# Patient Record
Sex: FEMALE | Race: WHITE | NOT HISPANIC OR LATINO | Employment: OTHER | URBAN - METROPOLITAN AREA
[De-identification: names, ages, dates, MRNs, and addresses within clinical notes are randomized per-mention and may not be internally consistent; named-entity substitution may affect disease eponyms.]

---

## 2019-05-17 PROBLEM — K59.09 OTHER CONSTIPATION: Status: ACTIVE | Noted: 2019-05-17

## 2019-05-17 PROBLEM — R39.81 FUNCTIONAL URINARY INCONTINENCE: Status: ACTIVE | Noted: 2019-05-17

## 2019-05-17 PROBLEM — R06.83 SNORING: Status: ACTIVE | Noted: 2019-05-17

## 2019-05-17 PROBLEM — E66.01 MORBID OBESITY WITH BMI OF 40.0-44.9, ADULT (HCC): Status: ACTIVE | Noted: 2019-05-17

## 2019-05-17 PROBLEM — I10 ESSENTIAL HYPERTENSION: Status: ACTIVE | Noted: 2019-05-17

## 2019-05-17 PROBLEM — R01.1 HEART MURMUR: Status: ACTIVE | Noted: 2019-05-17

## 2019-05-17 PROBLEM — G89.29 CHRONIC MIDLINE LOW BACK PAIN: Status: ACTIVE | Noted: 2019-05-17

## 2019-05-17 PROBLEM — K64.8 OTHER HEMORRHOIDS: Status: ACTIVE | Noted: 2019-05-17

## 2019-05-17 PROBLEM — H90.6 MIXED CONDUCTIVE AND SENSORINEURAL HEARING LOSS OF BOTH EARS: Status: ACTIVE | Noted: 2019-05-17

## 2019-05-17 PROBLEM — R09.81 SINUS CONGESTION: Status: ACTIVE | Noted: 2019-05-17

## 2019-05-17 PROBLEM — R60.1 GENERALIZED EDEMA: Status: ACTIVE | Noted: 2019-05-17

## 2019-05-17 PROBLEM — M54.50 CHRONIC MIDLINE LOW BACK PAIN: Status: ACTIVE | Noted: 2019-05-17

## 2019-07-01 PROBLEM — Z13.6 ENCOUNTER FOR LIPID SCREENING FOR CARDIOVASCULAR DISEASE: Status: ACTIVE | Noted: 2019-07-01

## 2019-07-01 PROBLEM — D50.9 IRON DEFICIENCY ANEMIA: Status: ACTIVE | Noted: 2019-07-01

## 2019-07-01 PROBLEM — Z13.220 ENCOUNTER FOR LIPID SCREENING FOR CARDIOVASCULAR DISEASE: Status: ACTIVE | Noted: 2019-07-01

## 2019-10-17 PROBLEM — M79.605 PAIN IN LEFT LEG: Status: ACTIVE | Noted: 2019-10-17

## 2021-03-03 PROBLEM — R60.1 GENERALIZED EDEMA: Status: RESOLVED | Noted: 2019-05-17 | Resolved: 2021-03-03

## 2021-03-03 PROBLEM — G89.29 CHRONIC MIDLINE LOW BACK PAIN: Status: RESOLVED | Noted: 2019-05-17 | Resolved: 2021-03-03

## 2021-03-03 PROBLEM — Z13.220 ENCOUNTER FOR LIPID SCREENING FOR CARDIOVASCULAR DISEASE: Status: RESOLVED | Noted: 2019-07-01 | Resolved: 2021-03-03

## 2021-03-03 PROBLEM — R09.81 SINUS CONGESTION: Status: RESOLVED | Noted: 2019-05-17 | Resolved: 2021-03-03

## 2021-03-03 PROBLEM — M79.605 PAIN IN LEFT LEG: Status: RESOLVED | Noted: 2019-10-17 | Resolved: 2021-03-03

## 2021-03-03 PROBLEM — Z13.6 ENCOUNTER FOR LIPID SCREENING FOR CARDIOVASCULAR DISEASE: Status: RESOLVED | Noted: 2019-07-01 | Resolved: 2021-03-03

## 2021-03-03 PROBLEM — M54.50 CHRONIC MIDLINE LOW BACK PAIN: Status: RESOLVED | Noted: 2019-05-17 | Resolved: 2021-03-03

## 2021-10-18 ENCOUNTER — TELEPHONE (OUTPATIENT)
Dept: HEALTH INFORMATION MANAGEMENT | Facility: OTHER | Age: 86
End: 2021-10-18

## 2021-10-18 NOTE — TELEPHONE ENCOUNTER
Outcome: granddaughter declined annual with pcp   Please transfer to Patient Outreach Team at [phone] when patient returns call.        Attempt #1

## 2022-02-08 PROBLEM — K59.09 OTHER CONSTIPATION: Status: RESOLVED | Noted: 2019-05-17 | Resolved: 2022-02-08

## 2022-02-08 PROBLEM — K64.8 OTHER HEMORRHOIDS: Status: RESOLVED | Noted: 2019-05-17 | Resolved: 2022-02-08

## 2022-08-29 PROBLEM — C34.92 ADENOCARCINOMA OF LUNG, LEFT (HCC): Status: ACTIVE | Noted: 2022-08-29

## 2022-08-29 PROBLEM — J90 RECURRENT PLEURAL EFFUSION ON LEFT: Status: ACTIVE | Noted: 2022-08-29
